# Patient Record
Sex: MALE | Race: BLACK OR AFRICAN AMERICAN | NOT HISPANIC OR LATINO | Employment: OTHER | URBAN - METROPOLITAN AREA
[De-identification: names, ages, dates, MRNs, and addresses within clinical notes are randomized per-mention and may not be internally consistent; named-entity substitution may affect disease eponyms.]

---

## 2023-02-02 ENCOUNTER — TRANSCRIBE ORDERS (OUTPATIENT)
Dept: ADMINISTRATIVE | Facility: HOSPITAL | Age: 58
End: 2023-02-02
Payer: OTHER GOVERNMENT

## 2023-02-02 DIAGNOSIS — J98.9 DISEASE OF RESPIRATORY SYSTEM: Primary | ICD-10-CM

## 2023-02-02 DIAGNOSIS — J98.9 RESPIRATORY DISEASE: Primary | ICD-10-CM

## 2023-02-21 ENCOUNTER — HOSPITAL ENCOUNTER (OUTPATIENT)
Dept: RESPIRATORY THERAPY | Facility: HOSPITAL | Age: 58
Discharge: HOME | End: 2023-02-21
Admitting: INTERNAL MEDICINE
Payer: OTHER GOVERNMENT

## 2023-02-21 DIAGNOSIS — J98.9 RESPIRATORY DISEASE: ICD-10-CM

## 2023-02-21 PROCEDURE — 94729 DIFFUSING CAPACITY: CPT | Performed by: INTERNAL MEDICINE

## 2023-02-21 PROCEDURE — 94060 EVALUATION OF WHEEZING: CPT

## 2023-02-21 PROCEDURE — 94726 PLETHYSMOGRAPHY LUNG VOLUMES: CPT | Performed by: INTERNAL MEDICINE

## 2023-02-21 PROCEDURE — 94729 DIFFUSING CAPACITY: CPT

## 2023-02-21 PROCEDURE — 94726 PLETHYSMOGRAPHY LUNG VOLUMES: CPT

## 2023-02-21 PROCEDURE — 94060 EVALUATION OF WHEEZING: CPT | Performed by: INTERNAL MEDICINE

## 2023-02-21 RX ORDER — ALBUTEROL SULFATE 2.5 MG/3ML
2.5 SOLUTION RESPIRATORY (INHALATION) ONCE
Status: COMPLETED | OUTPATIENT
Start: 2023-02-21 | End: 2023-02-21

## 2023-02-21 RX ADMIN — ALBUTEROL SULFATE 2.5 MG: 2.5 SOLUTION RESPIRATORY (INHALATION) at 06:44

## 2023-03-08 ENCOUNTER — OFFICE VISIT (OUTPATIENT)
Dept: PULMONOLOGY | Facility: CLINIC | Age: 58
End: 2023-03-08
Payer: OTHER GOVERNMENT

## 2023-03-08 ENCOUNTER — HOSPITAL ENCOUNTER (OUTPATIENT)
Dept: GENERAL RADIOLOGY | Facility: HOSPITAL | Age: 58
Discharge: HOME | End: 2023-03-08
Admitting: INTERNAL MEDICINE
Payer: OTHER GOVERNMENT

## 2023-03-08 VITALS
HEIGHT: 68 IN | BODY MASS INDEX: 25.19 KG/M2 | TEMPERATURE: 98 F | WEIGHT: 166.2 LBS | RESPIRATION RATE: 18 BRPM | DIASTOLIC BLOOD PRESSURE: 76 MMHG | SYSTOLIC BLOOD PRESSURE: 153 MMHG | OXYGEN SATURATION: 100 % | HEART RATE: 62 BPM

## 2023-03-08 DIAGNOSIS — F17.201 TOBACCO ABUSE, IN REMISSION: ICD-10-CM

## 2023-03-08 DIAGNOSIS — J44.1 COPD WITH ACUTE EXACERBATION: ICD-10-CM

## 2023-03-08 DIAGNOSIS — J44.1 COPD WITH ACUTE EXACERBATION: Primary | ICD-10-CM

## 2023-03-08 PROCEDURE — 71046 X-RAY EXAM CHEST 2 VIEWS: CPT

## 2023-03-08 PROCEDURE — 99203 OFFICE O/P NEW LOW 30 MIN: CPT | Performed by: INTERNAL MEDICINE

## 2023-03-08 RX ORDER — BUDESONIDE AND FORMOTEROL FUMARATE DIHYDRATE 160; 4.5 UG/1; UG/1
2 AEROSOL RESPIRATORY (INHALATION) 2 TIMES DAILY
Qty: 1 EACH | Refills: 3 | Status: SHIPPED | OUTPATIENT
Start: 2023-03-08 | End: 2023-03-09

## 2023-03-08 RX ORDER — AMOXICILLIN 500 MG/1
500 CAPSULE ORAL 3 TIMES DAILY
Qty: 21 CAPSULE | Refills: 0 | Status: SHIPPED | OUTPATIENT
Start: 2023-03-08 | End: 2023-03-15

## 2023-03-08 RX ORDER — MIRTAZAPINE 15 MG/1
15 TABLET, FILM COATED ORAL NIGHTLY
COMMUNITY

## 2023-03-08 RX ORDER — ATORVASTATIN CALCIUM 20 MG/1
20 TABLET, FILM COATED ORAL DAILY
COMMUNITY

## 2023-03-08 RX ORDER — CELECOXIB 100 MG/1
100 CAPSULE ORAL 2 TIMES DAILY PRN
COMMUNITY

## 2023-03-08 RX ORDER — PREDNISONE 20 MG/1
40 TABLET ORAL DAILY
Qty: 14 TABLET | Refills: 0 | Status: SHIPPED | OUTPATIENT
Start: 2023-03-08

## 2023-03-08 RX ORDER — SILDENAFIL 50 MG/1
50 TABLET, FILM COATED ORAL DAILY PRN
COMMUNITY

## 2023-03-08 RX ORDER — PEN NEEDLE, DIABETIC 29 G X1/2"
NEEDLE, DISPOSABLE MISCELLANEOUS
COMMUNITY

## 2023-03-08 NOTE — PROGRESS NOTES
Pulmonary Consultation    Byron Tejada PA,    Thank you for asking me to see Geronimo Kang for   Chief Complaint   Patient presents with   • Establish Care     New Patient   • COPD   • Shortness of Breath   • Wheezing   • Cough   .      History of Present Illness  Geronimo Kang is a 57 y.o. male with a PMH significant for COPD and tobacco abuse comes for evaluation patient has been having worsening breathlessness along with cough wheeze and mucopurulent expectoration patient has been having chest congestion with tightness off and on he denies any fever chest pain or hemoptysis patient has already quit smoking patient has been using albuterol inhaler on an as needed basis       Tobacco use history:  Former smoker      Review of Systems: History obtained from chart review and the patient.  Review of Systems   Respiratory: Positive for cough, shortness of breath and wheezing.    All other systems reviewed and are negative.    As described in the HPI. Otherwise, remainder of ROS (14 systems) were negative.    There is no problem list on file for this patient.        Current Outpatient Medications:   •  ALBUTEROL IN, Inhale., Disp: , Rfl:   •  atorvastatin (LIPITOR) 20 MG tablet, Take 1 tablet by mouth Daily., Disp: , Rfl:   •  celecoxib (CeleBREX) 100 MG capsule, Take 1 capsule by mouth 2 (Two) Times a Day As Needed for Mild Pain., Disp: , Rfl:   •  glucose blood (Accu-Chek Guide) test strip, 1 each by Other route As Needed. Use as instructed, Disp: , Rfl:   •  Insulin Pen Needle (AboutTime Pen Needle) 32G X 4 MM misc, Needle for insulin, Disp: , Rfl:   •  mirtazapine (REMERON) 15 MG tablet, Take 1 tablet by mouth Every Night., Disp: , Rfl:   •  Semaglutide,0.25 or 0.5MG/DOS, (OZEMPIC) 2 MG/1.5ML solution pen-injector, Inject  under the skin into the appropriate area as directed 1 (One) Time Per Week. 0.5mg/ 0.375ml inj, Disp: , Rfl:   •  sertraline (ZOLOFT) 50 MG tablet, Take 1 tablet by mouth Daily., Disp: , Rfl:  "  •  sildenafil (VIAGRA) 50 MG tablet, Take 1 tablet by mouth Daily As Needed for Erectile Dysfunction., Disp: , Rfl:   •  amoxicillin (AMOXIL) 500 MG capsule, Take 1 capsule by mouth 3 (Three) Times a Day for 7 days., Disp: 21 capsule, Rfl: 0  •  budesonide-formoterol (SYMBICORT) 160-4.5 MCG/ACT inhaler, Inhale 2 puffs 2 (Two) Times a Day., Disp: 1 each, Rfl: 3  •  predniSONE (DELTASONE) 20 MG tablet, Take 2 tablets by mouth Daily., Disp: 14 tablet, Rfl: 0    Allergies   Allergen Reactions   • Metformin Nausea And Vomiting       History reviewed. No pertinent past medical history.  Past Surgical History:   Procedure Laterality Date   • EYE SURGERY       Social History     Socioeconomic History   • Marital status:    Tobacco Use   • Smoking status: Former     Packs/day: 0.50     Years: 30.00     Pack years: 15.00     Types: Cigarettes     Quit date: 2022     Years since quittin.7   • Smokeless tobacco: Never   Vaping Use   • Vaping Use: Never used   Substance and Sexual Activity   • Alcohol use: Never   • Drug use: Never   • Sexual activity: Defer     Family History   Problem Relation Age of Onset   • Hypertension Mother    • Diabetes Mother    • Diabetes Sister    • Diabetes Brother    • Diabetes Brother        No radiology results for the last 90 days.        Objective     Blood pressure 153/76, pulse 62, temperature 98 °F (36.7 °C), temperature source Tympanic, resp. rate 18, height 172.7 cm (68\"), weight 75.4 kg (166 lb 3.2 oz), SpO2 100 %.  Physical Exam  Vitals and nursing note reviewed.   Constitutional:       Appearance: He is ill-appearing.   HENT:      Head: Normocephalic and atraumatic.      Nose: Nose normal.      Mouth/Throat:      Mouth: Mucous membranes are moist.   Eyes:      Conjunctiva/sclera: Conjunctivae normal.      Pupils: Pupils are equal, round, and reactive to light.   Cardiovascular:      Rate and Rhythm: Normal rate and regular rhythm.      Pulses: Normal pulses.      Heart " sounds: Normal heart sounds.   Pulmonary:      Effort: Pulmonary effort is normal.      Breath sounds: Wheezing and rhonchi present.   Abdominal:      General: Abdomen is flat. Bowel sounds are normal.      Palpations: Abdomen is soft.   Musculoskeletal:         General: Normal range of motion.      Cervical back: Normal range of motion and neck supple.   Skin:     General: Skin is warm.      Capillary Refill: Capillary refill takes less than 2 seconds.   Neurological:      General: No focal deficit present.      Mental Status: He is alert and oriented to person, place, and time.   Psychiatric:         Mood and Affect: Mood normal.         Behavior: Behavior normal.       Immunization History   Administered Date(s) Administered   • COVID-19 (MODERNA) 1st, 2nd, 3rd Dose Only 04/02/2021, 04/30/2021, 03/04/2022   • Influenza, Unspecified 12/21/2022            Assessment & Plan     Diagnoses and all orders for this visit:    1. COPD with acute exacerbation (HCC) (Primary)  -     XR Chest 2 View; Future    2. Tobacco abuse, in remission  -     XR Chest 2 View; Future    Other orders  -     amoxicillin (AMOXIL) 500 MG capsule; Take 1 capsule by mouth 3 (Three) Times a Day for 7 days.  Dispense: 21 capsule; Refill: 0  -     predniSONE (DELTASONE) 20 MG tablet; Take 2 tablets by mouth Daily.  Dispense: 14 tablet; Refill: 0  -     budesonide-formoterol (SYMBICORT) 160-4.5 MCG/ACT inhaler; Inhale 2 puffs 2 (Two) Times a Day.  Dispense: 1 each; Refill: 3         Discussion/ Recommendations:   Patient is advised albuterol inhaler every 6 hours as needed  Symbicort inhaler every 12 hours  PFT was reviewed shows severe reduction in diffusion capacity with normal spirometry  Will order chest x-ray for evaluation  Patient has already quit smoking  Advise regular exercise and avoid dust and smoke  Discussed vaccination and recommended    BMI is >= 25 and <30. (Overweight) The following options were offered after discussion;:  exercise counseling/recommendations           Return in about 4 weeks (around 4/5/2023).      Thank you for allowing me to participate in the care of [facility]. Please do not hesitate to contact me with any questions.         This document has been electronically signed by Jeremias Steven MD on March 8, 2023 15:52 EST

## 2023-03-09 RX ORDER — FLUTICASONE PROPIONATE AND SALMETEROL 250; 50 UG/1; UG/1
1 POWDER RESPIRATORY (INHALATION)
Qty: 60 EACH | Refills: 5 | Status: SHIPPED | OUTPATIENT
Start: 2023-03-09

## 2023-04-02 NOTE — PROGRESS NOTES
Primary Care Provider  Provider, No Known     Referring Provider  No ref. provider found     Chief Complaint  Cough, Shortness of Breath, Wheezing, COPD, and Follow-up    Subjective          History of Presenting Illness  Patient is a 57-year-old male, patient of Dr. Clark who presents management of COPD who presents for a follow-up visit today.  Since last office visit patient had a pulmonary function test completed on 2/21/2023.  Report states spirometry shows no evidence of obstruction.  No response of bronchodilator therapies.  No evidence of restriction seen.  There is air trapping present.  Diffusion capacity severely reduced.  Patient also had a chest x-ray completed on 3/8/2023. Report states pulmonary edema versus atypical pneumonia with small bilateral pleural effusions.  Patient states that he was contacted by our office and advised to go to the emergency room.  Patient states [facility] and was prescribed medication.  Patient reports that he does not have a list of that medication that he was prescribed by the [facility].  Unfortunately, our office do not have a copy of those [facility] records either.  Patient states that he is currently taking Advair and Combivent and use albuterol nebulizer treatments as needed.  Patient states that he does not notice much of a difference with his breathing with Advair and Combivent inhalers.  Patient states that he is still having coughing and wheezing.  Patient states that he is currently not working.  Patient states that he was in the  and was exposed to chemicals during [place].  Patient is a former cigarette smoker reports that he smoked a half a pack of cigarettes a day for 30 years and quit smoking on 6/1/2022.  Patient denies any recent travel.  Patient denies any known exposure to any ill contacts.  Patient denies any history of any drug use.  Patient denies any history of any malignancies, specifically lung  cancer with himself.  Patient denies any history of asthma as a child.  Patient denies any heart issues.  Patient is not on an ACE inhibitor.  Patient states that he does have reflux and was started on medication by [facility] recently.  Patient states that diabetes does run in his family. Patient denies fever, chills, night sweats, swollen glands in the head and neck, unintentional weight loss, hemoptysis, purulent sputum production, dysphagia, chest pain, palpitations, chest tightness, abdominal pain, nausea, vomiting, and diarrhea.  Patient also denies any myalgias, changes in sense of taste and/or smell, sore throat, any other coronavirus or flu-like symptoms.  Patient denies any leg swelling, orthopnea, paroxysmal nocturnal dyspnea.  Patient is able to perform activities of daily living.        Review of Systems   Constitutional: Negative for activity change, appetite change, chills, diaphoresis, fatigue, fever, unexpected weight gain and unexpected weight loss.        Negative for Insomnia   HENT: Negative for congestion (Nasal), mouth sores, nosebleeds, postnasal drip, sore throat, swollen glands and trouble swallowing.         Negative for Thrush  Negative for Hoarseness  Negative for Allergies/Hay Fever  Negative for Recent Head injury  Negative for Ear Fullness  Negative for Nasal or Sinus pain  Negative for Dry lips  Negative for Nasal discharge   Respiratory: Positive for cough, shortness of breath and wheezing (intermittent). Negative for apnea and chest tightness.         Negative for Hemoptysis  Negative for Pleuritic pain   Cardiovascular: Negative for chest pain, palpitations and leg swelling.        Negative for Claudication  Negative for Cyanosis  Negative for Dyspnea on exertion   Gastrointestinal: Negative for abdominal pain, diarrhea, nausea, vomiting and GERD.   Musculoskeletal: Negative for joint swelling and myalgias.        Negative for Joint pain  Negative for  Joint stiffness   Skin: Negative for color change, dry skin, pallor and rash.   Neurological: Negative for syncope, weakness and headache.   Hematological: Negative for adenopathy. Does not bruise/bleed easily.        Family History   Problem Relation Age of Onset   • Hypertension Mother    • Diabetes Mother    • Diabetes Sister    • Diabetes Brother    • Diabetes Brother         Social History     Socioeconomic History   • Marital status:    Tobacco Use   • Smoking status: Former     Packs/day: 0.50     Years: 30.00     Pack years: 15.00     Types: Cigarettes     Start date:      Quit date: 2022     Years since quittin.8     Passive exposure: Past   • Smokeless tobacco: Never   Vaping Use   • Vaping Use: Never used   Substance and Sexual Activity   • Alcohol use: Never   • Drug use: Never   • Sexual activity: Defer        History reviewed. No pertinent past medical history.     Immunization History   Administered Date(s) Administered   • COVID-19 (MODERNA) 1st, 2nd, 3rd Dose Only 2021, 2021, 2022   • Influenza, Unspecified 2022   • Pneumococcal Conjugate 13-Valent (PCV13) 08/15/2016   • Pneumococcal Polysaccharide (PPSV23) 2015, 2019       Allergies   Allergen Reactions   • Metformin Nausea And Vomiting          Current Outpatient Medications:   •  albuterol (ACCUNEB) 0.63 MG/3ML nebulizer solution, Take 3 mL by nebulization Every 6 (Six) Hours As Needed for Wheezing. [facility], Disp: , Rfl:   •  atorvastatin (LIPITOR) 20 MG tablet, Take 1 tablet by mouth Daily., Disp: , Rfl:   •  celecoxib (CeleBREX) 100 MG capsule, Take 1 capsule by mouth 2 (Two) Times a Day As Needed for Mild Pain., Disp: , Rfl:   •  pantoprazole (PROTONIX) 40 MG EC tablet, Take 1 tablet by mouth Daily., Disp: , Rfl:   •  Semaglutide,0.25 or 0.5MG/DOS, (OZEMPIC) 2 MG/1.5ML solution pen-injector, Inject  under the skin into the appropriate area as directed 1 (One) Time Per  "Week. 0.5mg/ 0.375ml inj, Disp: , Rfl:   •  sildenafil (VIAGRA) 50 MG tablet, Take 1 tablet by mouth Daily As Needed for Erectile Dysfunction., Disp: , Rfl:   •  albuterol sulfate  (90 Base) MCG/ACT inhaler, Inhale 2 puffs Every 4 (Four) Hours As Needed for Wheezing or Shortness of Air for up to 90 days., Disp: 54 g, Rfl: 3  •  Budeson-Glycopyrrol-Formoterol (Breztri Aerosphere) 160-9-4.8 MCG/ACT aerosol inhaler, Inhale 2 puffs 2 (Two) Times a Day for 90 days., Disp: 3 each, Rfl: 3  •  glucose blood (Accu-Chek Guide) test strip, 1 each by Other route As Needed. Use as instructed, Disp: , Rfl:   •  Insulin Pen Needle (AboutTime Pen Needle) 32G X 4 MM misc, Needle for insulin, Disp: , Rfl:   •  mirtazapine (REMERON) 15 MG tablet, Take 1 tablet by mouth Every Night. (Patient not taking: Reported on 4/11/2023), Disp: , Rfl:   •  sertraline (ZOLOFT) 50 MG tablet, Take 1 tablet by mouth Daily. (Patient not taking: Reported on 4/11/2023), Disp: , Rfl:      Objective     Physical Exam  Vital Signs:   WDWN, Alert, NAD.    HEENT:  PERRL, EOMI.  OP, nares clear, no sinus tenderness  Neck:  Supple, no JVD, no thyromegaly.  Lymph: no axillary, cervical, supraclavicular lymphadenopathy noted bilaterally  Chest:  good aeration, clear to auscultation bilaterally, tympanic to percussion bilaterally, no work of breathing noted  CV: RRR, no MGR, pulses 2+, equal.  Abd:  Soft, NT, ND, + BS, no HSM  EXT:  no clubbing, no cyanosis, no edema, no joint tenderness  Neuro:  A&Ox3, CN grossly intact, no focal deficits.  Skin: No rashes or lesions noted.    /70 (BP Location: Left arm, Patient Position: Sitting, Cuff Size: Large Adult)   Pulse 58   Temp 98.7 °F (37.1 °C) (Tympanic)   Resp 16   Ht 172.7 cm (67.99\")   Wt 74.3 kg (163 lb 14.4 oz)   SpO2 100% Comment: room air  BMI 24.93 kg/m²         Result Review :   I have reviewed Dr. Steven's last office visit note.  I also reviewed chest x-ray report dated from 3/8/2023.  " See scanned report.    Procedures:      XR Chest 2 View    Result Date: 3/8/2023   Pulmonary edema versus atypical pneumonia with small bilateral pleural effusions.     MICHELE MCLEOD MD       Electronically Signed and Approved By: MICHELE MCLEOD MD on 3/08/2023 at 22:27                 Assessment and Plan        Assessment:  1.  COPD.  No obstruction on PFTs, however air trapping present.  2.  Pleural effusions.  3.  Severely decreased diffusion capacity on PFTs.  4.  Abnormal chest x-ray.  5.  Cough.  6.  Intermittent wheezing.  7.  GERD.  Patient on a PPI  8.  Tobacco abuse of cigarettes in remission.      Plan:  1. Patient states that he does not notice much of a difference with his breathing with Advair and Combivent inhalers.  Will stop Advair and Combivent.  Will start patient on Breztri 2 puffs twice daily.  Patient is advised to rinse his mouth out after each use.  How to take medication and possible side effects of medication discussed with the patient.  Patient verbalized understanding and compliance.  2.  Will prescribe patient albuterol inhaler to use as needed.  Patient to continue albuterol nebulizer treatments as needed.  3. Feno/exhaled nitric oxide testing performed in the office today with a level of 12 signifying no eosinophilic airway inflammation.  4. Alpha 1 antitrypsin level and genotype drawn in the office today.  5.  PFT showing a severely reduced diffusion capacity.  Will order a high-resolution chest CT scan and echocardiogram for further evaluation.  Orders placed today.  Will order a chest CT scan.  6.  For cough, we will order a respiratory culture, AFB culture, CBC, CMP, IgE level, and proBNP.  7.  Will request [facility] along with imaging studies to be faxed to our office.  8.  Vaccination status: patient reports they are up-to-date with flu, pneumonia, and Covid vaccines.  Patient is advised to continue to follow CDC recommendations such as  social distancing wearing a mask and washing hands for at least 20 seconds.  9.   Smoking status: Patient is a former cigarette smoker.  10.  Patient to call the office, 911, or go to the ER with new or worsening symptoms.  11.  Follow-up 6 weeks with Dr. Steven, sooner if needed.              Follow Up   Return for 6 weeks with Dr. Steven.  Patient was given instructions and counseling regarding his condition or for health maintenance advice. Please see specific information pulled into the AVS if appropriate.

## 2023-04-11 ENCOUNTER — TELEPHONE (OUTPATIENT)
Dept: PULMONOLOGY | Facility: CLINIC | Age: 58
End: 2023-04-11

## 2023-04-11 ENCOUNTER — OFFICE VISIT (OUTPATIENT)
Dept: PULMONOLOGY | Facility: CLINIC | Age: 58
End: 2023-04-11
Payer: OTHER GOVERNMENT

## 2023-04-11 VITALS
BODY MASS INDEX: 24.84 KG/M2 | TEMPERATURE: 98.7 F | HEART RATE: 58 BPM | WEIGHT: 163.9 LBS | SYSTOLIC BLOOD PRESSURE: 142 MMHG | HEIGHT: 68 IN | RESPIRATION RATE: 16 BRPM | OXYGEN SATURATION: 100 % | DIASTOLIC BLOOD PRESSURE: 70 MMHG

## 2023-04-11 DIAGNOSIS — R94.2 ABNORMAL PFTS: ICD-10-CM

## 2023-04-11 DIAGNOSIS — K21.9 GASTROESOPHAGEAL REFLUX DISEASE, UNSPECIFIED WHETHER ESOPHAGITIS PRESENT: ICD-10-CM

## 2023-04-11 DIAGNOSIS — R05.9 COUGH, UNSPECIFIED TYPE: ICD-10-CM

## 2023-04-11 DIAGNOSIS — R06.00 DYSPNEA, UNSPECIFIED TYPE: Primary | ICD-10-CM

## 2023-04-11 DIAGNOSIS — R06.2 WHEEZING: ICD-10-CM

## 2023-04-11 DIAGNOSIS — J90 PLEURAL EFFUSION: ICD-10-CM

## 2023-04-11 LAB — EXHALED NITROUS OXIDE: 12

## 2023-04-11 PROCEDURE — 99214 OFFICE O/P EST MOD 30 MIN: CPT | Performed by: NURSE PRACTITIONER

## 2023-04-11 PROCEDURE — 95012 NITRIC OXIDE EXP GAS DETER: CPT | Performed by: NURSE PRACTITIONER

## 2023-04-11 RX ORDER — ALBUTEROL SULFATE 0.63 MG/3ML
1 SOLUTION RESPIRATORY (INHALATION) EVERY 6 HOURS PRN
COMMUNITY

## 2023-04-11 RX ORDER — ALBUTEROL SULFATE 90 UG/1
2 AEROSOL, METERED RESPIRATORY (INHALATION) EVERY 4 HOURS PRN
Qty: 54 G | Refills: 3 | Status: SHIPPED | OUTPATIENT
Start: 2023-04-11 | End: 2023-07-10

## 2023-04-11 RX ORDER — BUDESONIDE, GLYCOPYRROLATE, AND FORMOTEROL FUMARATE 160; 9; 4.8 UG/1; UG/1; UG/1
2 AEROSOL, METERED RESPIRATORY (INHALATION) 2 TIMES DAILY
Qty: 3 EACH | Refills: 3 | Status: SHIPPED | OUTPATIENT
Start: 2023-04-11 | End: 2023-07-10

## 2023-04-11 RX ORDER — PANTOPRAZOLE SODIUM 40 MG/1
40 TABLET, DELAYED RELEASE ORAL DAILY
COMMUNITY

## 2023-06-08 ENCOUNTER — OFFICE VISIT (OUTPATIENT)
Dept: PULMONOLOGY | Facility: CLINIC | Age: 58
End: 2023-06-08
Payer: OTHER GOVERNMENT

## 2023-06-08 VITALS
DIASTOLIC BLOOD PRESSURE: 52 MMHG | RESPIRATION RATE: 17 BRPM | OXYGEN SATURATION: 97 % | TEMPERATURE: 97.8 F | HEART RATE: 72 BPM | HEIGHT: 68 IN | WEIGHT: 158 LBS | BODY MASS INDEX: 23.95 KG/M2 | SYSTOLIC BLOOD PRESSURE: 92 MMHG

## 2023-06-08 DIAGNOSIS — J41.8 MIXED SIMPLE AND MUCOPURULENT CHRONIC BRONCHITIS: Primary | ICD-10-CM

## 2023-06-08 DIAGNOSIS — I50.22 CHRONIC SYSTOLIC CONGESTIVE HEART FAILURE: ICD-10-CM

## 2023-06-08 PROCEDURE — 99213 OFFICE O/P EST LOW 20 MIN: CPT | Performed by: INTERNAL MEDICINE

## 2023-06-08 RX ORDER — DORZOLAMIDE HCL 20 MG/ML
1 SOLUTION/ DROPS OPHTHALMIC 3 TIMES DAILY
COMMUNITY

## 2023-06-08 RX ORDER — INSULIN GLARGINE 100 [IU]/ML
INJECTION, SOLUTION SUBCUTANEOUS DAILY
COMMUNITY

## 2023-06-08 RX ORDER — FUROSEMIDE 20 MG/1
20 TABLET ORAL 2 TIMES DAILY
COMMUNITY

## 2023-06-08 RX ORDER — LATANOPROST 50 UG/ML
1 SOLUTION/ DROPS OPHTHALMIC NIGHTLY
COMMUNITY

## 2023-06-08 RX ORDER — METOPROLOL SUCCINATE 25 MG/1
25 TABLET, EXTENDED RELEASE ORAL DAILY
COMMUNITY

## 2023-06-08 RX ORDER — SPIRONOLACTONE 25 MG/1
25 TABLET ORAL DAILY
COMMUNITY

## 2023-06-08 RX ORDER — ACETAMINOPHEN 325 MG/1
650 TABLET ORAL EVERY 6 HOURS PRN
COMMUNITY

## 2023-06-08 RX ORDER — ASPIRIN 81 MG/1
81 TABLET ORAL DAILY
COMMUNITY

## 2023-06-08 NOTE — PROGRESS NOTES
Pulmonary Consultation    No ref. provider found,    Thank you for asking me to see Geronimo Kang for   Chief Complaint   Patient presents with    Dyspnea    Follow-up     Echo, Stress results     Shortness of Breath   .      History of Present Illness  Geronimo Kang is a 57 y.o. male with a PMH significant for COPD and CHF presents for follow-up patient has been doing well and denies any shortness of breath cough wheezing or expectoration he was evaluated by cardiology and has been placed on diuretics and seems to be doing much better patient has already quit smoking      Tobacco use history:  Former smoker      Review of Systems: History obtained from chart review and the patient.  Review of Systems   Respiratory:  Positive for shortness of breath.    All other systems reviewed and are negative.  As described in the HPI. Otherwise, remainder of ROS (14 systems) were negative.    Patient Active Problem List   Diagnosis    Dyspnea    Abnormal PFTs    Pleural effusion    Cough    Gastroesophageal reflux disease    Wheezing         Current Outpatient Medications:     acetaminophen (TYLENOL) 325 MG tablet, Take 2 tablets by mouth Every 6 (Six) Hours As Needed for Mild Pain., Disp: , Rfl:     albuterol (ACCUNEB) 0.63 MG/3ML nebulizer solution, Take 3 mL by nebulization Every 6 (Six) Hours As Needed for Wheezing. [facility], Disp: , Rfl:     albuterol sulfate  (90 Base) MCG/ACT inhaler, Inhale 2 puffs Every 4 (Four) Hours As Needed for Wheezing or Shortness of Air for up to 90 days., Disp: 54 g, Rfl: 3    aspirin 81 MG EC tablet, Take 1 tablet by mouth Daily., Disp: , Rfl:     atorvastatin (LIPITOR) 20 MG tablet, Take 1 tablet by mouth Daily., Disp: , Rfl:     dorzolamide (TRUSOPT) 2 % ophthalmic solution, 1 drop 3 (Three) Times a Day., Disp: , Rfl:     empagliflozin (JARDIANCE) 25 MG tablet tablet, Take  by mouth Daily., Disp: , Rfl:     furosemide (LASIX) 20 MG tablet, Take 1 tablet by mouth 2  (Two) Times a Day., Disp: , Rfl:     glucose blood test strip, 1 each by Other route As Needed. Use as instructed, Disp: , Rfl:     insulin glargine (LANTUS, SEMGLEE) 100 UNIT/ML injection, Inject  under the skin into the appropriate area as directed Daily., Disp: , Rfl:     Insulin Pen Needle (AboutTime Pen Needle) 32G X 4 MM misc, Needle for insulin, Disp: , Rfl:     latanoprost (XALATAN) 0.005 % ophthalmic solution, 1 drop Every Night., Disp: , Rfl:     metoprolol succinate XL (TOPROL-XL) 25 MG 24 hr tablet, Take 1 tablet by mouth Daily., Disp: , Rfl:     pantoprazole (PROTONIX) 40 MG EC tablet, Take 1 tablet by mouth Daily., Disp: , Rfl:     sacubitril-valsartan (ENTRESTO) 24-26 MG tablet, Take 1 tablet by mouth 2 (Two) Times a Day., Disp: , Rfl:     Semaglutide,0.25 or 0.5MG/DOS, (OZEMPIC) 2 MG/1.5ML solution pen-injector, Inject  under the skin into the appropriate area as directed 1 (One) Time Per Week. 0.5mg/ 0.375ml inj, Disp: , Rfl:     spironolactone (ALDACTONE) 25 MG tablet, Take 1 tablet by mouth Daily., Disp: , Rfl:     Budeson-Glycopyrrol-Formoterol (Breztri Aerosphere) 160-9-4.8 MCG/ACT aerosol inhaler, Inhale 2 puffs 2 (Two) Times a Day for 90 days. (Patient not taking: Reported on 6/8/2023), Disp: 3 each, Rfl: 3    celecoxib (CeleBREX) 100 MG capsule, Take 1 capsule by mouth 2 (Two) Times a Day As Needed for Mild Pain. (Patient not taking: Reported on 6/8/2023), Disp: , Rfl:     mirtazapine (REMERON) 15 MG tablet, Take 1 tablet by mouth Every Night. (Patient not taking: Reported on 4/11/2023), Disp: , Rfl:     sertraline (ZOLOFT) 50 MG tablet, Take 1 tablet by mouth Daily. (Patient not taking: Reported on 4/11/2023), Disp: , Rfl:     sildenafil (VIAGRA) 50 MG tablet, Take 1 tablet by mouth Daily As Needed for Erectile Dysfunction. (Patient not taking: Reported on 6/8/2023), Disp: , Rfl:     Allergies   Allergen Reactions    Metformin Nausea And Vomiting       History reviewed. No pertinent past  "medical history.  Past Surgical History:   Procedure Laterality Date    EYE SURGERY       Social History     Socioeconomic History    Marital status:    Tobacco Use    Smoking status: Former     Packs/day: 0.50     Years: 30.00     Pack years: 15.00     Types: Cigarettes     Start date:      Quit date: 2022     Years since quittin.0     Passive exposure: Past    Smokeless tobacco: Never   Vaping Use    Vaping Use: Never used   Substance and Sexual Activity    Alcohol use: Never    Drug use: Never    Sexual activity: Defer     Family History   Problem Relation Age of Onset    Hypertension Mother     Diabetes Mother     Diabetes Sister     Diabetes Brother     Diabetes Brother        No radiology results for the last 90 days.        Objective     Blood pressure 92/52, pulse 72, temperature 97.8 °F (36.6 °C), temperature source Temporal, resp. rate 17, height 172.7 cm (67.99\"), weight 71.7 kg (158 lb), SpO2 97 %.  Physical Exam  Vitals and nursing note reviewed.   HENT:      Head: Normocephalic and atraumatic.      Nose: Nose normal.      Mouth/Throat:      Mouth: Mucous membranes are moist.   Eyes:      Pupils: Pupils are equal, round, and reactive to light.   Cardiovascular:      Rate and Rhythm: Normal rate and regular rhythm.      Pulses: Normal pulses.      Heart sounds: Normal heart sounds.   Pulmonary:      Effort: Pulmonary effort is normal.      Breath sounds: Normal breath sounds.   Abdominal:      General: Abdomen is flat. Bowel sounds are normal.      Palpations: Abdomen is soft.   Musculoskeletal:         General: Normal range of motion.      Cervical back: Normal range of motion and neck supple.   Skin:     General: Skin is warm.      Capillary Refill: Capillary refill takes less than 2 seconds.   Neurological:      General: No focal deficit present.      Mental Status: He is alert.   Psychiatric:         Mood and Affect: Mood normal.     Immunization History   Administered Date(s) " Administered    COVID-19 (MODERNA) 1st,2nd,3rd Dose Monovalent 04/02/2021, 04/30/2021, 03/04/2022    COVID-19 (MODERNA) BIVALENT 12+YRS 05/24/2023    Influenza Injectable Mdck Pf Quad 12/21/2022    Influenza, Unspecified 12/21/2022    Pneumococcal Conjugate 13-Valent (PCV13) 08/15/2016    Pneumococcal Polysaccharide (PPSV23) 05/18/2015, 11/14/2019            Assessment & Plan     Diagnoses and all orders for this visit:    1. Mixed simple and mucopurulent chronic bronchitis (Primary)    2. Chronic systolic congestive heart failure         Discussion/ Recommendations:   Patient is doing well he is advised to continue his Breztri twice daily and to continue Lasix along with home medications for his CHF  Strict 2 g sodium diet  He has already quit smoking  Discussed vaccination and recommended    BMI is within normal parameters. No other follow-up for BMI required.           Return in about 3 months (around 9/8/2023).      Thank you for allowing me to participate in the care of Gernoimo Kang. Please do not hesitate to contact me with any questions.         This document has been electronically signed by Jeremias Steven MD on June 8, 2023 15:57 EDT

## 2023-09-13 ENCOUNTER — OFFICE VISIT (OUTPATIENT)
Dept: PULMONOLOGY | Facility: CLINIC | Age: 58
End: 2023-09-13
Payer: OTHER GOVERNMENT

## 2023-09-13 VITALS
HEART RATE: 64 BPM | SYSTOLIC BLOOD PRESSURE: 139 MMHG | TEMPERATURE: 97.8 F | HEIGHT: 68 IN | DIASTOLIC BLOOD PRESSURE: 85 MMHG | WEIGHT: 163 LBS | RESPIRATION RATE: 16 BRPM | OXYGEN SATURATION: 98 % | BODY MASS INDEX: 24.71 KG/M2

## 2023-09-13 DIAGNOSIS — J41.8 MIXED SIMPLE AND MUCOPURULENT CHRONIC BRONCHITIS: Primary | ICD-10-CM

## 2023-09-13 DIAGNOSIS — I50.22 CHRONIC SYSTOLIC CONGESTIVE HEART FAILURE: ICD-10-CM

## 2023-09-13 PROCEDURE — 99213 OFFICE O/P EST LOW 20 MIN: CPT | Performed by: INTERNAL MEDICINE

## 2023-09-13 NOTE — PROGRESS NOTES
Pulmonary Office Follow-up    Subjective     Geronimo Kang is seen today at the office for   Chief Complaint   Patient presents with    Follow-up    COPD    Shortness of Breath         HPI  Geronimo Kang is a 57 y.o. male with a PMH significant for COPD and CHF presents for follow-up patient has quit smoking he denies any significant cough or expectoration patient denies any chest pain fever or hemoptysis he does have some dyspnea on exertion but he is currently taking his medications he has already quit smoking      Tobacco use history:  Former smoker      Patient Active Problem List   Diagnosis    Dyspnea    Abnormal PFTs    Pleural effusion    Cough    Gastroesophageal reflux disease    Wheezing       Review of Systems  Review of Systems   Respiratory:  Positive for shortness of breath.    All other systems reviewed and are negative.  As described in the HPI. Otherwise, remainder of ROS (14 systems) were negative.    Medications, Allergies, Social, and Family Histories reviewed as per EMR.    Objective     Vitals:    09/13/23 1011   BP: 139/85   Pulse: 64   Resp: 16   Temp: 97.8 °F (36.6 °C)   SpO2: 98%         09/13/23  1011   Weight: 73.9 kg (163 lb)       Physical Exam  Vitals and nursing note reviewed.   Constitutional:       Appearance: Normal appearance.   HENT:      Head: Normocephalic and atraumatic.      Nose: Nose normal.      Mouth/Throat:      Mouth: Mucous membranes are moist.      Pharynx: Oropharynx is clear.   Eyes:      Extraocular Movements: Extraocular movements intact.      Conjunctiva/sclera: Conjunctivae normal.      Pupils: Pupils are equal, round, and reactive to light.   Cardiovascular:      Rate and Rhythm: Normal rate and regular rhythm.      Pulses: Normal pulses.      Heart sounds: Normal heart sounds.   Pulmonary:      Effort: Pulmonary effort is normal.      Breath sounds: Normal breath sounds.   Abdominal:      General: Abdomen is flat. Bowel sounds are normal.      Palpations:  Abdomen is soft.   Musculoskeletal:         General: Normal range of motion.      Cervical back: Normal range of motion and neck supple.   Skin:     General: Skin is warm.      Capillary Refill: Capillary refill takes 2 to 3 seconds.   Neurological:      General: No focal deficit present.      Mental Status: He is alert and oriented to person, place, and time.   Psychiatric:         Mood and Affect: Mood normal.         Behavior: Behavior normal.       No radiology results for the last 90 days.     Assessment & Plan     Diagnoses and all orders for this visit:    1. Mixed simple and mucopurulent chronic bronchitis (Primary)    2. Chronic systolic congestive heart failure         Discussion/ Recommendations:   Patient appears to be stable and is doing well  Advised to continue his current medications  Strict 2 g sodium diet  Continue Breztri twice daily  Vaccinations discussed and recommended    BMI is within normal parameters. No other follow-up for BMI required.        Return in about 3 months (around 12/13/2023).          This document has been electronically signed by Jeremias Steven MD on September 13, 2023 10:21 EDT

## 2024-09-25 ENCOUNTER — APPOINTMENT (OUTPATIENT)
Dept: CT IMAGING | Facility: HOSPITAL | Age: 59
End: 2024-09-25
Payer: OTHER GOVERNMENT

## 2024-09-25 ENCOUNTER — HOSPITAL ENCOUNTER (EMERGENCY)
Facility: HOSPITAL | Age: 59
Discharge: HOME | End: 2024-09-25
Attending: EMERGENCY MEDICINE
Payer: OTHER GOVERNMENT

## 2024-09-25 VITALS
OXYGEN SATURATION: 100 % | TEMPERATURE: 97.6 F | BODY MASS INDEX: 26.82 KG/M2 | DIASTOLIC BLOOD PRESSURE: 96 MMHG | HEIGHT: 67 IN | WEIGHT: 170.86 LBS | HEART RATE: 69 BPM | SYSTOLIC BLOOD PRESSURE: 113 MMHG | RESPIRATION RATE: 18 BRPM

## 2024-09-25 DIAGNOSIS — H05.012 CELLULITIS OF LEFT ORBITAL REGION: Primary | ICD-10-CM

## 2024-09-25 DIAGNOSIS — L03.213 PERIORBITAL CELLULITIS OF LEFT EYE: ICD-10-CM

## 2024-09-25 LAB
ALBUMIN SERPL-MCNC: 3.9 G/DL (ref 3.5–5.2)
ALBUMIN/GLOB SERPL: 1 G/DL
ALP SERPL-CCNC: 92 U/L (ref 39–117)
ALT SERPL W P-5'-P-CCNC: 15 U/L (ref 1–41)
ANION GAP SERPL CALCULATED.3IONS-SCNC: 8.4 MMOL/L (ref 5–15)
AST SERPL-CCNC: 13 U/L (ref 1–40)
BASOPHILS # BLD AUTO: 0.01 10*3/MM3 (ref 0–0.2)
BASOPHILS NFR BLD AUTO: 0.2 % (ref 0–1.5)
BILIRUB SERPL-MCNC: 0.2 MG/DL (ref 0–1.2)
BUN SERPL-MCNC: 21 MG/DL (ref 6–20)
BUN/CREAT SERPL: 18.6 (ref 7–25)
CALCIUM SPEC-SCNC: 9.3 MG/DL (ref 8.6–10.5)
CHLORIDE SERPL-SCNC: 103 MMOL/L (ref 98–107)
CO2 SERPL-SCNC: 25.6 MMOL/L (ref 22–29)
CREAT SERPL-MCNC: 1.13 MG/DL (ref 0.76–1.27)
DEPRECATED RDW RBC AUTO: 46.7 FL (ref 37–54)
EGFRCR SERPLBLD CKD-EPI 2021: 75.8 ML/MIN/1.73
EOSINOPHIL # BLD AUTO: 0.03 10*3/MM3 (ref 0–0.4)
EOSINOPHIL NFR BLD AUTO: 0.5 % (ref 0.3–6.2)
ERYTHROCYTE [DISTWIDTH] IN BLOOD BY AUTOMATED COUNT: 14.2 % (ref 12.3–15.4)
GLOBULIN UR ELPH-MCNC: 3.9 GM/DL
GLUCOSE SERPL-MCNC: 138 MG/DL (ref 65–99)
HCT VFR BLD AUTO: 41.5 % (ref 37.5–51)
HGB BLD-MCNC: 13.4 G/DL (ref 13–17.7)
IMM GRANULOCYTES # BLD AUTO: 0.01 10*3/MM3 (ref 0–0.05)
IMM GRANULOCYTES NFR BLD AUTO: 0.2 % (ref 0–0.5)
LYMPHOCYTES # BLD AUTO: 2.26 10*3/MM3 (ref 0.7–3.1)
LYMPHOCYTES NFR BLD AUTO: 41 % (ref 19.6–45.3)
MCH RBC QN AUTO: 29.1 PG (ref 26.6–33)
MCHC RBC AUTO-ENTMCNC: 32.3 G/DL (ref 31.5–35.7)
MCV RBC AUTO: 90 FL (ref 79–97)
MONOCYTES # BLD AUTO: 0.39 10*3/MM3 (ref 0.1–0.9)
MONOCYTES NFR BLD AUTO: 7.1 % (ref 5–12)
NEUTROPHILS NFR BLD AUTO: 2.81 10*3/MM3 (ref 1.7–7)
NEUTROPHILS NFR BLD AUTO: 51 % (ref 42.7–76)
NRBC BLD AUTO-RTO: 0 /100 WBC (ref 0–0.2)
PLATELET # BLD AUTO: 227 10*3/MM3 (ref 140–450)
PMV BLD AUTO: 10.6 FL (ref 6–12)
POTASSIUM SERPL-SCNC: 4.4 MMOL/L (ref 3.5–5.2)
PROT SERPL-MCNC: 7.8 G/DL (ref 6–8.5)
RBC # BLD AUTO: 4.61 10*6/MM3 (ref 4.14–5.8)
SODIUM SERPL-SCNC: 137 MMOL/L (ref 136–145)
WBC NRBC COR # BLD AUTO: 5.51 10*3/MM3 (ref 3.4–10.8)

## 2024-09-25 PROCEDURE — 85025 COMPLETE CBC W/AUTO DIFF WBC: CPT

## 2024-09-25 PROCEDURE — 80053 COMPREHEN METABOLIC PANEL: CPT

## 2024-09-25 PROCEDURE — 25510000001 IOPAMIDOL PER 1 ML: Performed by: EMERGENCY MEDICINE

## 2024-09-25 PROCEDURE — 70481 CT ORBIT/EAR/FOSSA W/DYE: CPT

## 2024-09-25 PROCEDURE — 99285 EMERGENCY DEPT VISIT HI MDM: CPT

## 2024-09-25 PROCEDURE — 36415 COLL VENOUS BLD VENIPUNCTURE: CPT

## 2024-09-25 RX ORDER — CLINDAMYCIN HCL 300 MG
300 CAPSULE ORAL 3 TIMES DAILY
Qty: 15 CAPSULE | Refills: 0 | Status: SHIPPED | OUTPATIENT
Start: 2024-09-25 | End: 2024-09-30

## 2024-09-25 RX ORDER — IOPAMIDOL 755 MG/ML
100 INJECTION, SOLUTION INTRAVASCULAR
Status: COMPLETED | OUTPATIENT
Start: 2024-09-25 | End: 2024-09-25

## 2024-09-25 RX ADMIN — IOPAMIDOL 75 ML: 755 INJECTION, SOLUTION INTRAVENOUS at 11:48

## 2024-09-25 NOTE — DISCHARGE INSTRUCTIONS
I have prescribed you an antibiotic to take.  Please retrieve from your pharmacy and take all of it.  However you still need to follow-up with your eye doctor as we discussed during your ER visit.  Call first thing tomorrow and schedule the first available appointment.  If it anytime you develop sudden loss of vision, a pressure sensation behind your eye, or your eyes feel to the point that you cannot open it please return to the ER otherwise follow-up with your eye doctor as soon as possible.

## 2024-09-25 NOTE — ED PROVIDER NOTES
Time: 9:44 AM EDT  Date of encounter:  9/25/2024  Room number: 29/29  Independent Historian/Clinical History and Information was obtained by:   Patient    History is limited by: N/A    Chief Complaint: Left orbital swelling      History of Present Illness:  Patient is a 58 y.o. year old male who presents to the emergency department for evaluation of left orbital swelling.  Patient advises he started having swelling to his left orbital area on Monday and each day it has progressively gotten worse.  He describes area to be painful.  He has had no change in vision and eye does not itch. He also reports the sensation of a something being in his eye particularly to the outer upper lid area. Patient was referred to ER by PCP for additional workup and imaging.  Warm compresses are reported to help with that improvement is minimal and the swelling returns.    HPI    Patient Care Team  Primary Care Provider: Provider, Loyda Known    Past Medical History:     Allergies   Allergen Reactions    Metformin Nausea And Vomiting     Past Medical History:   Diagnosis Date    Glaucoma      Past Surgical History:   Procedure Laterality Date    EYE SURGERY       Family History   Problem Relation Age of Onset    Hypertension Mother     Diabetes Mother     Diabetes Sister     Diabetes Brother     Diabetes Brother        Home Medications:  Prior to Admission medications    Medication Sig Start Date End Date Taking? Authorizing Provider   acetaminophen (TYLENOL) 325 MG tablet Take 2 tablets by mouth Every 6 (Six) Hours As Needed for Mild Pain.    ProviderOsmani MD   albuterol (ACCUNEB) 0.63 MG/3ML nebulizer solution Take 3 mL by nebulization Every 6 (Six) Hours As Needed for Wheezing. [facility]    Osmani Wesley MD   aspirin 81 MG EC tablet Take 1 tablet by mouth Daily.    Osmani Wesley MD   atorvastatin (LIPITOR) 20 MG tablet Take 1 tablet by mouth Daily.    Osmani Wesley MD   celecoxib (CeleBREX)  100 MG capsule Take 1 capsule by mouth 2 (Two) Times a Day As Needed for Mild Pain.  Patient not taking: Reported on 6/8/2023    Osmani Wesley MD   dorzolamide (TRUSOPT) 2 % ophthalmic solution 1 drop 3 (Three) Times a Day.    Osmani Wesley MD   empagliflozin (JARDIANCE) 25 MG tablet tablet Take  by mouth Daily.    Osmani Wesley MD   furosemide (LASIX) 20 MG tablet Take 1 tablet by mouth 2 (Two) Times a Day.    Osmani Wesley MD   glucose blood test strip 1 each by Other route As Needed. Use as instructed    Osmani Wesley MD   insulin glargine (LANTUS, SEMGLEE) 100 UNIT/ML injection Inject  under the skin into the appropriate area as directed Daily.    Osmani Wesley MD   Insulin Pen Needle (AboutTime Pen Needle) 32G X 4 MM misc Needle for insulin    Osmani Wesley MD   latanoprost (XALATAN) 0.005 % ophthalmic solution 1 drop Every Night.    Osmani Wesley MD   metoprolol succinate XL (TOPROL-XL) 25 MG 24 hr tablet Take 1 tablet by mouth Daily.    Osmani Wesley MD   mirtazapine (REMERON) 15 MG tablet Take 1 tablet by mouth Every Night.  Patient not taking: Reported on 4/11/2023    Osmani Wesley MD   pantoprazole (PROTONIX) 40 MG EC tablet Take 1 tablet by mouth Daily.    Osmani Wesley MD   sacubitril-valsartan (ENTRESTO) 24-26 MG tablet Take 1 tablet by mouth 2 (Two) Times a Day.    Osmani Wesley MD   Semaglutide,0.25 or 0.5MG/DOS, (OZEMPIC) 2 MG/1.5ML solution pen-injector Inject  under the skin into the appropriate area as directed 1 (One) Time Per Week. 0.5mg/ 0.375ml inj    Osmani Wesley MD   sertraline (ZOLOFT) 50 MG tablet Take 1 tablet by mouth Daily.  Patient not taking: Reported on 4/11/2023    Osmani Wesley MD   sildenafil (VIAGRA) 50 MG tablet Take 1 tablet by mouth Daily As Needed for Erectile Dysfunction.  Patient not taking: Reported on 6/8/2023    Osmani Wesley MD   spironolactone  "(ALDACTONE) 25 MG tablet Take 1 tablet by mouth Daily.    Provider, Historical, MD        Social History:   Social History     Tobacco Use    Smoking status: Former     Current packs/day: 0.00     Average packs/day: 0.5 packs/day for 31.4 years (15.7 ttl pk-yrs)     Types: Cigarettes     Start date:      Quit date: 2022     Years since quittin.3     Passive exposure: Past    Smokeless tobacco: Never   Vaping Use    Vaping status: Never Used   Substance Use Topics    Alcohol use: Never    Drug use: Never         Review of Systems:  Review of Systems   Constitutional:  Negative for chills and fever.   HENT:  Negative for congestion, ear pain and sore throat.    Eyes:  Positive for pain (Powder area and tissue surrounding.  Minimal pain, no sensation of foreign body). Negative for redness and visual disturbance.   Respiratory:  Negative for cough, chest tightness and shortness of breath.    Cardiovascular:  Negative for chest pain.   Gastrointestinal:  Negative for abdominal pain, diarrhea, nausea and vomiting.   Genitourinary:  Negative for flank pain and hematuria.   Musculoskeletal:  Negative for joint swelling.   Skin:  Negative for pallor.   Neurological:  Negative for seizures and headaches.   All other systems reviewed and are negative.       Physical Exam:  /96 (BP Location: Right arm, Patient Position: Lying)   Pulse 69   Temp 97.6 °F (36.4 °C) (Oral)   Resp 18   Ht 170.2 cm (67\")   Wt 77.5 kg (170 lb 13.7 oz)   SpO2 100%   BMI 26.76 kg/m²     Physical Exam  Vitals and nursing note reviewed.   Constitutional:       General: He is not in acute distress.     Appearance: Normal appearance. He is not ill-appearing, toxic-appearing or diaphoretic.   HENT:      Head: Normocephalic and atraumatic.      Mouth/Throat:      Mouth: Mucous membranes are moist.   Eyes:      General: No scleral icterus.        Right eye: No discharge.         Left eye: No discharge.      Extraocular Movements: " Extraocular movements intact.      Pupils: Pupils are equal, round, and reactive to light.      Comments: Mild edema to bottom of left lid, edema to later corner of lid. No drainage. No change in vision. No obvious FB   Cardiovascular:      Rate and Rhythm: Normal rate and regular rhythm.      Pulses: Normal pulses.      Heart sounds: Normal heart sounds.   Pulmonary:      Effort: Pulmonary effort is normal. No respiratory distress.      Breath sounds: Normal breath sounds.   Abdominal:      General: Abdomen is flat.      Palpations: Abdomen is soft.      Tenderness: There is no abdominal tenderness.   Musculoskeletal:         General: Normal range of motion.      Cervical back: Normal range of motion and neck supple.   Skin:     General: Skin is warm and dry.   Neurological:      Mental Status: He is alert and oriented to person, place, and time. Mental status is at baseline.                  Procedures:  Procedures      Medical Decision Making:      Comorbidities that affect care:    Glaucoma    External Notes reviewed:          The following orders were placed and all results were independently analyzed by me:  Orders Placed This Encounter   Procedures    CT Orbits With Contrast    Comprehensive Metabolic Panel    CBC Auto Differential    CBC & Differential       Medications Given in the Emergency Department:  Medications   iopamidol (ISOVUE-370) 76 % injection 100 mL (75 mL Intravenous Given 9/25/24 1148)        ED Course:    ED Course as of 09/25/24 1618   Wed Sep 25, 2024   1122 Called CT to check on status of scan  [MS]   1323 Called to check on status of CT results - tech advised they look into why it has not been read. Scan completed at 1145 but final read remains pending.  [MS]   1337 Patient confirms that he has a ophthalmologist that he can follow-up with [MS]      ED Course User Index  [MS] Denita Mesa APRN       Labs:    Lab Results (last 24 hours)       Procedure Component Value Units  Date/Time    CBC & Differential [[id number]]  (Normal) Collected: 09/25/24 1032    Specimen: Blood Updated: 09/25/24 1037    Narrative:      The following orders were created for panel order CBC & Differential.  Procedure                               Abnormality         Status                     ---------                               -----------         ------                     CBC Auto Differential[[id number]]        Normal              Final result                 Please view results for these tests on the individual orders.    Comprehensive Metabolic Panel [[id number]]  (Abnormal) Collected: 09/25/24 1032    Specimen: Blood Updated: 09/25/24 1054     Glucose 138 mg/dL      BUN 21 mg/dL      Creatinine 1.13 mg/dL      Sodium 137 mmol/L      Potassium 4.4 mmol/L      Chloride 103 mmol/L      CO2 25.6 mmol/L      Calcium 9.3 mg/dL      Total Protein 7.8 g/dL      Albumin 3.9 g/dL      ALT (SGPT) 15 U/L      AST (SGOT) 13 U/L      Alkaline Phosphatase 92 U/L      Total Bilirubin 0.2 mg/dL      Globulin 3.9 gm/dL      A/G Ratio 1.0 g/dL      BUN/Creatinine Ratio 18.6     Anion Gap 8.4 mmol/L      eGFR 75.8 mL/min/1.73     Narrative:      GFR Normal >60  Chronic Kidney Disease <60  Kidney Failure <15      CBC Auto Differential [[id number]]  (Normal) Collected: 09/25/24 1032    Specimen: Blood Updated: 09/25/24 1037     WBC 5.51 10*3/mm3      RBC 4.61 10*6/mm3      Hemoglobin 13.4 g/dL      Hematocrit 41.5 %      MCV 90.0 fL      MCH 29.1 pg      MCHC 32.3 g/dL      RDW 14.2 %      RDW-SD 46.7 fl      MPV 10.6 fL      Platelets 227 10*3/mm3      Neutrophil % 51.0 %      Lymphocyte % 41.0 %      Monocyte % 7.1 %      Eosinophil % 0.5 %      Basophil % 0.2 %      Immature Grans % 0.2 %      Neutrophils, Absolute 2.81 10*3/mm3      Lymphocytes, Absolute 2.26 10*3/mm3      Monocytes, Absolute 0.39 10*3/mm3      Eosinophils, Absolute 0.03 10*3/mm3      Basophils, Absolute 0.01 10*3/mm3      Immature Grans, Absolute 0.01  10*3/mm3      nRBC 0.0 /100 WBC              Imaging:    CT Orbits With Contrast    Result Date: 9/25/2024  CT ORBITS W CONTRAST Date of Exam: 9/25/2024 11:42 AM EDT Indication: left orbital cellulitis with edema. Comparison: None available. Technique: Axial CT images were obtained of the orbits after the uneventful intravenous administration of iodinated contrast.  Reconstructed coronal and sagittal images were also obtained. Automated exposure control and iterative construction methods were used. Findings: Bilateral cataract extractions have been performed. The ocular globes otherwise appear unremarkable. There is no evidence of post septal cellulitis on either side. Visualized facial soft tissues appear grossly unremarkable by CT. Visualized brain appears unremarkable. The paranasal sinuses are clear bilaterally. No mastoid or middle ear effusion is seen on either side.     Impression: Previous bilateral cataract extractions. No evidence of post septal orbital cellulitis on either side. Electronically Signed: Meek Pizarro MD  9/25/2024 1:22 PM EDT  Workstation ID: [id number]       Differential Diagnosis and Discussion:    Eye Pain/Blurred Vision: Differential diagnosis includes but is not limited to dacryocystitis, hordeolum, chalazion, periorbital cellulitis, cavernous sinus thrombosis, blepharitis, and glaucoma.    All labs were reviewed and interpreted by me.  CT scan radiology impression was interpreted by me.    [facility]               Patient Care Considerations:    I considered completing an MRI however patient had no sudden loss of vision, no change of vision, no eye pressure, no obvious corneal hemorrhage, and there was no concerns of retinal artery occlusion      Consultants/Shared Management Plan:    None    Social Determinants of Health:    Patient is independent, reliable, and has access to care.       Disposition and Care Coordination:    Discharged: The patient is suitable and stable for discharge with no  need for consideration of admission.    I have explained the patient´s condition, diagnoses and treatment plan based on the information available to me at this time. I have answered questions and addressed any concerns. The patient has a good  understanding of the patient´s diagnosis, condition, and treatment plan as can be expected at this point. The vital signs have been stable. The patient´s condition is stable and appropriate for discharge from the emergency department.      The patient will pursue further outpatient evaluation with the primary care physician or other designated or consulting physician as outlined in the discharge instructions. They are agreeable to this plan of care and follow-up instructions have been explained in detail. The patient has received these instructions in written format and has expressed an understanding of the discharge instructions. The patient is aware that any significant change in condition or worsening of symptoms should prompt an immediate return to this or the closest emergency department or call to 911.    Final diagnoses:   Cellulitis of left orbital region   Periorbital cellulitis of left eye        ED Disposition       ED Disposition   Discharge    Condition   Stable    Comment   --               This medical record created using voice recognition software.       Denita Mesa, FRIDA  09/25/24 1331

## 2025-04-02 ENCOUNTER — HOSPITAL ENCOUNTER (EMERGENCY)
Facility: HOSPITAL | Age: 60
Discharge: HOME | End: 2025-04-02
Attending: EMERGENCY MEDICINE
Payer: OTHER GOVERNMENT

## 2025-04-02 VITALS
TEMPERATURE: 99 F | SYSTOLIC BLOOD PRESSURE: 168 MMHG | HEART RATE: 75 BPM | WEIGHT: 168 LBS | OXYGEN SATURATION: 100 % | BODY MASS INDEX: 25.46 KG/M2 | DIASTOLIC BLOOD PRESSURE: 83 MMHG | HEIGHT: 68 IN | RESPIRATION RATE: 18 BRPM

## 2025-04-02 DIAGNOSIS — H10.11 ALLERGIC CONJUNCTIVITIS OF RIGHT EYE: Primary | ICD-10-CM

## 2025-04-02 DIAGNOSIS — H57.9 OCULAR PRURITUS: ICD-10-CM

## 2025-04-02 PROCEDURE — 99282 EMERGENCY DEPT VISIT SF MDM: CPT

## 2025-04-02 PROCEDURE — 99283 EMERGENCY DEPT VISIT LOW MDM: CPT

## 2025-04-02 RX ORDER — CLINDAMYCIN HYDROCHLORIDE 300 MG/1
300 CAPSULE ORAL 3 TIMES DAILY
Qty: 9 CAPSULE | Refills: 0 | Status: SHIPPED | OUTPATIENT
Start: 2025-04-02 | End: 2025-04-05

## 2025-04-02 RX ORDER — OLOPATADINE HYDROCHLORIDE 1 MG/ML
1 SOLUTION/ DROPS OPHTHALMIC DAILY
Qty: 5 ML | Refills: 0 | Status: SHIPPED | OUTPATIENT
Start: 2025-04-02

## 2025-04-02 NOTE — ED PROVIDER NOTES
Time: 12:11 PM EDT  Date of encounter:  4/2/2025  Room number: 58/58  Independent Historian/Clinical History and Information was obtained by:   Patient    History is limited by: N/A    Chief Complaint: right eye irritation      History of Present Illness:  Patient is a 59 y.o. year old male who presents to the emergency department for evaluation of right eye irritation.  Patient states yesterday he started having some discomfort to his right eye.  This morning he noticed his eye slightly swollen but states it has improved since this morning.  He describes it as itchy and burning.  He has noticed an increase in tearing.  He denies a sensation of a foreign body, has had no change in vision, and has no pain behind the eye.    HPI    Patient Care Team  Primary Care Provider: Provider, No Known    Past Medical History:     Allergies   Allergen Reactions    Empagliflozin Urinary Retention    Lisinopril Nausea And Vomiting    Metformin Nausea And Vomiting     Past Medical History:   Diagnosis Date    Glaucoma      Past Surgical History:   Procedure Laterality Date    EYE SURGERY       Family History   Problem Relation Age of Onset    Hypertension Mother     Diabetes Mother     Diabetes Sister     Diabetes Brother     Diabetes Brother        Home Medications:  Prior to Admission medications    Medication Sig Start Date End Date Taking? Authorizing Provider   acetaminophen (TYLENOL) 325 MG tablet Take 2 tablets by mouth Every 6 (Six) Hours As Needed for Mild Pain.    ProviderOsmani MD   albuterol (ACCUNEB) 0.63 MG/3ML nebulizer solution Take 3 mL by nebulization Every 6 (Six) Hours As Needed for Wheezing. [facility]    Osmani Wesley MD   aspirin 81 MG EC tablet Take 1 tablet by mouth Daily.    Osmani Wesley MD   atorvastatin (LIPITOR) 20 MG tablet Take 1 tablet by mouth Daily.    Osmani Wesley MD   celecoxib (CeleBREX) 100 MG capsule Take 1 capsule by mouth 2 (Two) Times a Day As  Needed for Mild Pain.  Patient not taking: Reported on 6/8/2023    Osmani Wesley MD   dorzolamide (TRUSOPT) 2 % ophthalmic solution 1 drop 3 (Three) Times a Day.    Osmani Wesley MD   empagliflozin (JARDIANCE) 25 MG tablet tablet Take  by mouth Daily.    Osmani Wesley MD   furosemide (LASIX) 20 MG tablet Take 1 tablet by mouth 2 (Two) Times a Day.    Osmani Wesley MD   glucose blood test strip 1 each by Other route As Needed. Use as instructed    Osmani Wesley MD   insulin glargine (LANTUS, SEMGLEE) 100 UNIT/ML injection Inject  under the skin into the appropriate area as directed Daily.    Osmani Wesley MD   Insulin Pen Needle (AboutTime Pen Needle) 32G X 4 MM misc Needle for insulin    Osmani Wesley MD   latanoprost (XALATAN) 0.005 % ophthalmic solution 1 drop Every Night.    Osmani Wesley MD   metoprolol succinate XL (TOPROL-XL) 25 MG 24 hr tablet Take 1 tablet by mouth Daily.    Osmani Wesley MD   mirtazapine (REMERON) 15 MG tablet Take 1 tablet by mouth Every Night.  Patient not taking: Reported on 4/11/2023    Osmani Wesley MD   pantoprazole (PROTONIX) 40 MG EC tablet Take 1 tablet by mouth Daily.    Osmani Wesley MD   sacubitril-valsartan (ENTRESTO) 24-26 MG tablet Take 1 tablet by mouth 2 (Two) Times a Day.    Osmani Wesley MD   Semaglutide,0.25 or 0.5MG/DOS, (OZEMPIC) 2 MG/1.5ML solution pen-injector Inject  under the skin into the appropriate area as directed 1 (One) Time Per Week. 0.5mg/ 0.375ml inj    Osmani Wesley MD   sertraline (ZOLOFT) 50 MG tablet Take 1 tablet by mouth Daily.  Patient not taking: Reported on 4/11/2023    Osmani Wesley MD   sildenafil (VIAGRA) 50 MG tablet Take 1 tablet by mouth Daily As Needed for Erectile Dysfunction.  Patient not taking: Reported on 6/8/2023    Osmani Wesley MD   spironolactone (ALDACTONE) 25 MG tablet Take 1 tablet by mouth Daily.    Maryjane  "Historical, MD        Social History:   Social History     Tobacco Use    Smoking status: Former     Current packs/day: 0.00     Average packs/day: 0.5 packs/day for 31.4 years (15.7 ttl pk-yrs)     Types: Cigarettes     Start date:      Quit date: 2022     Years since quittin.8     Passive exposure: Past    Smokeless tobacco: Never   Vaping Use    Vaping status: Never Used   Substance Use Topics    Alcohol use: Never    Drug use: Never         Review of Systems:  Review of Systems     I performed a review of systems today, which was all negative, except for the positives found in the HPI above.      Physical Exam:  /83   Pulse 75   Temp 99 °F (37.2 °C) (Oral)   Resp 18   Ht 172.7 cm (68\")   Wt 76.2 kg (168 lb)   SpO2 100%   BMI 25.54 kg/m²     Physical Exam   General:  Awake alert no apparent distress    Head: Normocephalic, atraumatic, eyes PERRLA EOMI, nose normal, oropharynx normal. No obvious cellulitic tissue surrounding right orbital area.  No gross edema.  There is some redness to the bottom of half of the sclera. No obvious FB. No obvious abscess.     Neck: Supple, no meningismus, no cervical lymphadenopathy or JVD    Heart: Regular rate and rhythm, no murmurs or rubs, 2+ radial pulses    Lungs: Clear to auscultation bilaterally, no wheezing or rhonchi or rales, no respiratory distress    Abdomen: Soft, nontender, nondistended, no signs of peritonitis    Skin: Warm, dry, no rash    Musculoskeletal: Normal range of motion, no obvious deformities, no edema noted    Neurologic: Awake, alert, oriented x 3, no motor or sensory deficits appreciated    Psych: No suicidal or homicidal ideations, no psychosis            Procedures:  Procedures      Medical Decision Making:      Comorbidities that affect care:    Diabetes    External Notes reviewed:    Previous Radiological Studies: I reviewed patient's CT of the orbits that was completed on his last ER visit which was 2024.      The " following orders were placed and all results were independently analyzed by me:  Orders Placed This Encounter   Procedures    Supplies To Bedside - Notify MD When Ready- Cali Pen       Medications Given in the Emergency Department:  Medications - No data to display     ED Course:    ED Course as of 04/02/25 1323   Wed Apr 02, 2025   1230 Patient states he was at his primary care provider prior to coming to the emergency department.  He was referred here for his symptoms.  Patient was consulted about if primary care provider had a specific test or exam in mind and he is know.  We discussed the matter findings today that patient will need to follow-up with his eye doctor due to his history of glaucoma.  Verbalizes understanding [MS]   1238 Patient confirms he is established with an eye doctor.  He has had cataracts removed and does use drops for glaucoma.  Per his medical records that he has with him his next appointment with eye surgeon is 8/13/2025.    Patient presented to the emergency department in September 2024 for similar symptoms in the left eye.  He was prescribed antibiotics and states that they did help.  A CT was also completed and showed no evidence of cellulitis or abscess. [MS]   1259 Tonopen exam completed in 3 separate readings obtained -pressure was measured to be 8 on 2 different occasions and 10 on 1 occasion.      Patient again confirms no pressure sensation or painful sensation behind the eye and no change in vision.    He again feels that his symptoms are like the ones he had in his left eye which were improved with antibiotics.  I have agreed to give him a short course of antibiotics as well as some antihistamine/allergy drops. [MS]      ED Course User Index  [MS] Denita Mesa, APRN       Labs:    Lab Results (last 24 hours)       ** No results found for the last 24 hours. **             Imaging:    No Radiology Exams Resulted Within Past 24 Hours      Differential Diagnosis and  Discussion:    Eye Pain/Blurred Vision: Differential diagnosis includes but is not limited to dacryocystitis, hordeolum, chalazion, periorbital cellulitis, cavernous sinus thrombosis, blepharitis, and glaucoma.        MDM                 Patient Care Considerations:    I considered completing a CT of orbits however there is no obvious edema, no pressure sensation behind eye.  No recent penetrating trauma, no change in vision, and pressure was obtained via Cali-Pen and within acceptable limits.      Consultants/Shared Management Plan:    None    Social Determinants of Health:    Patient is independent, reliable, and has access to care.       Disposition and Care Coordination:    Discharged: The patient is suitable and stable for discharge with no need for consideration of admission.    I have explained the patient´s condition, diagnoses and treatment plan based on the information available to me at this time. I have answered questions and addressed any concerns. The patient has a good  understanding of the patient´s diagnosis, condition, and treatment plan as can be expected at this point. The vital signs have been stable. The patient´s condition is stable and appropriate for discharge from the emergency department.      The patient will pursue further outpatient evaluation with the primary care physician or other designated or consulting physician as outlined in the discharge instructions. They are agreeable to this plan of care and follow-up instructions have been explained in detail. The patient has received these instructions in written format and has expressed an understanding of the discharge instructions. The patient is aware that any significant change in condition or worsening of symptoms should prompt an immediate return to this or the closest emergency department or call to 911.    Final diagnoses:   Allergic conjunctivitis of right eye   Ocular pruritus        ED Disposition       ED Disposition    Discharge    Condition   Stable    Comment   --               This medical record created using voice recognition software.       Denita Mesa, FRIDA  04/02/25 1311       Denita Mesa, FRIDA  04/02/25 1322

## 2025-04-02 NOTE — DISCHARGE INSTRUCTIONS
Please follow-up with your eye doctor as soon as possible.  I have prescribed you 2 medications to take.  1 is an antibiotic that you were prescribed the last time you had similar symptoms and report improvement with.  Although today I feel like your symptoms are mostly related to a condition called allergic conjunctivitis.  I have prescribed you eyedrops for those.  However with your history of glaucoma I would recommend only using once a day, verbally in the morning.  If it anytime you have a change of vision in your right eye, develop a pressure sensation behind your eye, or develop the worst headache of your life please return to the ER.  Otherwise follow-up with your eye doctor as soon as possible.